# Patient Record
Sex: FEMALE | Race: OTHER
[De-identification: names, ages, dates, MRNs, and addresses within clinical notes are randomized per-mention and may not be internally consistent; named-entity substitution may affect disease eponyms.]

---

## 2018-03-14 ENCOUNTER — HOSPITAL ENCOUNTER (OUTPATIENT)
Dept: HOSPITAL 72 - SUR | Age: 53
Discharge: HOME | End: 2018-03-14
Payer: COMMERCIAL

## 2018-03-14 VITALS — DIASTOLIC BLOOD PRESSURE: 72 MMHG | SYSTOLIC BLOOD PRESSURE: 109 MMHG

## 2018-03-14 VITALS — SYSTOLIC BLOOD PRESSURE: 118 MMHG | DIASTOLIC BLOOD PRESSURE: 73 MMHG

## 2018-03-14 VITALS — DIASTOLIC BLOOD PRESSURE: 72 MMHG | SYSTOLIC BLOOD PRESSURE: 119 MMHG

## 2018-03-14 VITALS — DIASTOLIC BLOOD PRESSURE: 71 MMHG | SYSTOLIC BLOOD PRESSURE: 105 MMHG

## 2018-03-14 VITALS — DIASTOLIC BLOOD PRESSURE: 63 MMHG | SYSTOLIC BLOOD PRESSURE: 122 MMHG

## 2018-03-14 VITALS — SYSTOLIC BLOOD PRESSURE: 131 MMHG | DIASTOLIC BLOOD PRESSURE: 76 MMHG

## 2018-03-14 VITALS — SYSTOLIC BLOOD PRESSURE: 112 MMHG | DIASTOLIC BLOOD PRESSURE: 72 MMHG

## 2018-03-14 VITALS — BODY MASS INDEX: 23.9 KG/M2 | WEIGHT: 140 LBS | HEIGHT: 64 IN

## 2018-03-14 VITALS — SYSTOLIC BLOOD PRESSURE: 120 MMHG | DIASTOLIC BLOOD PRESSURE: 68 MMHG

## 2018-03-14 VITALS — DIASTOLIC BLOOD PRESSURE: 70 MMHG | SYSTOLIC BLOOD PRESSURE: 111 MMHG

## 2018-03-14 VITALS — DIASTOLIC BLOOD PRESSURE: 69 MMHG | SYSTOLIC BLOOD PRESSURE: 107 MMHG

## 2018-03-14 DIAGNOSIS — M20.5X1: ICD-10-CM

## 2018-03-14 DIAGNOSIS — F32.9: ICD-10-CM

## 2018-03-14 DIAGNOSIS — M21.611: Primary | ICD-10-CM

## 2018-03-14 DIAGNOSIS — M20.11: ICD-10-CM

## 2018-03-14 DIAGNOSIS — M19.071: ICD-10-CM

## 2018-03-14 PROCEDURE — 94150 VITAL CAPACITY TEST: CPT

## 2018-03-14 PROCEDURE — 97161 PT EVAL LOW COMPLEX 20 MIN: CPT

## 2018-03-14 PROCEDURE — 28234 INCISION OF FOOT TENDON: CPT

## 2018-03-14 PROCEDURE — 94003 VENT MGMT INPAT SUBQ DAY: CPT

## 2018-03-14 PROCEDURE — 28299 COR HLX VLGS DOUBLE OSTEOT: CPT

## 2018-03-14 PROCEDURE — 73630 X-RAY EXAM OF FOOT: CPT

## 2018-03-14 NOTE — OPERATIVE NOTE - DICTATED
DATE OF OPERATION:  03/14/2018



SURGEON:  Js López D.P.M.



ANESTHESIOLOGIST:  Raul Pal M.D.



ASSISTANT:  [facility] anabel.



PREOPERATIVE DIAGNOSIS:  Hallux abductovalgus with bunion

deformity.



POSTOPERATIVE DIAGNOSES:

1. Hallux abductovalgus with bunion deformity.

2. Osteoarthritis of first metatarsophalangeal joint, right foot.

3. Contracture extensor hallucis longus, right foot.



Procedures:

1.  Modified Ramesh Bunionectomy right foot with screw fixation

2.  Akin Osteotomy right foot

3.  Extensor Hallucis Longus tendon lengthening right foot



DESCRIPTION OF THE OPERATION:  The patient was brought to the operating

room and was placed on the operating room table in the supine position.

Intravenous sedation was administered by the anesthesiologist.  Local

anesthesia consisting of 0.5% Marcaine plain total of 20 mL was

administered to the right foot.  An ankle tourniquet was applied to the

right lower extremity.  The foot was prepped and draped in the usual

sterile manner.  An Esmarch bandage was utilized to exsanguinate the blood

and the right ankle tourniquet was inflated to 250 mmHg.  Attention was

then directed to the right hallux were an approximately 6 cm dorsal linear

skin incision was centered over the first metatarsophalangeal joint.  The

incision was deepened utilizing sharp and blunt dissection with care being

taken to cauterize and ligate all bleeders.  At the level of the joint, a

linear capsulotomy was performed.  The capsule was reflected medially and

laterally and the head of the first metatarsal was exposed.  Utilizing a

sagittal saw, the medial eminence was resected in total.  The remaining

bone was rasped smooth.  The lateral release was then performed utilizing

a #15 blade.  The wound was copiously flushed utilizing sterile saline.

At this point, chevron osteotomy was performed with the apex distal and

the arms protruding proximally.  Through and through osteotomy was carried

utilizing the sagittal saw.  The capital fragment was transposed laterally

and fixated onto the metatarsal shaft utilizing 24 mm 3-0 headless screw,

which was driven from dorsal proximal to plantar distal.  At this point, a

sagittal saw was utilized to resect the overhang medially.  The bone was

rasped smooth.  The wound was copiously flushed utilizing sterile saline.

Dissection was then carried distally over the proximal phalanx where the

soft tissue capsule and periosteum was reflected off the shaft of the

proximal phalanx.  At this point, a medial wedge osteotomy was performed

with care being taken to leave the lateral cortex intact.  The osteotomy

was reduced.  Two drill bits just proximal and distal to the osteotomy

were performed and an 8 x 8 mm staple was then utilized to fixate the

osteotomy and the staple was inserted into the drill holes.  The hallux

was noted to still be dorsal medially contracted secondary to an

contracted extensor tendon.  At this point, an extensor tendon lengthening

was performed in a Z-plasty manner and the hallux was brought into a more

rectus position.  It was noted also that the cartilage on the first

metatarsal head was eroded.  The wound was copiously flushed utilizing

sterile saline.  At this point, the capsule was reapproximated utilizing

3-0 Vicryl in a simple interrupted type stitch.  The subcutaneous tissue

was then reapproximated utilizing 4-0 Vicryl in a simple interrupted type

stitch.  The skin was then reapproximated utilizing 4-0 nylon in a simple

interrupted type stitch.  The wound was dressed utilizing an Adaptic 4 x 4

gauze, and 3-inch Teresita.  There right ankle tourniquet was deflated and

vascular supply was noted to all digits right foot.









  ______________________________________________

  Js López D.P.M.





DR:  KANDACE

D:  03/14/2018 11:27

T:  03/14/2018 17:02

JOB#:  [id number]

CC:



HAYLEY

## 2018-03-14 NOTE — ANETHESIA PREOPERATIVE EVAL
Anesthesia Pre-op PMH/ROS


General


Date of Evaluation:  Mar 14, 2018


Time of Evaluation:  07:10


Anesthesiologist:  CHIARA


ASA Score:  ASA 2


Mallampati Score


Class I : Soft palate, uvula, fauces, pillars visible


Class II: Soft palate, uvula, fauces visible


Class III: Soft palate, base of uvula visible


Class IV: Only hard plate visible


Mallampati Classification:  Class II


Surgeon:  RUPERTO


Diagnosis:  RIGHT BUNION


Surgical Procedure:  RIGHT BUNIECTOMY


Anesthesia History:  none


Family History:  no anesthesia problems


Allergies:  


Coded Allergies:  


     No Known Allergies (Unverified , 3/13/18)


Medications:  see eMAR





Anesthesia Pre-op Phys. Exam


Physician Exam





Last Vital Signs








  Date Time  Temp Pulse Resp B/P (MAP) Pulse Ox O2 Delivery O2 Flow Rate FiO2


 


3/14/18 06:49 98.5 73 18 119/72 100 Room Air  





 98.5       








Constitutional:  NAD


Neurologic:  CN 2-12 intact


Cardiovascular:  RRR


Respiratory:  CTA





Airway Exam


Mallampati Score:  Class II


MO:  full


ROM:  full


Teeth:  intact





Anesthesia Pre-op A/P


Risk Assessment & Plan


Plan:


GA


Status Change Before Surgery:  No





Pre-Antibiotics


Drug:  ANCEF


Given Within 1 Hr of Incision:  Yes


Time Given:  08:00











Raul Pal M.D. Mar 14, 2018 07:21

## 2018-03-14 NOTE — HISTORY AND PHYSICAL REPORT
DATE OF ADMISSION:  03/14/2018



HISTORY OF PRESENT ILLNESS:  This is a 52-year-old white female that is

admitted today for right foot surgery.  The patient has been complaining

of right foot pain for the past several months.  She has tried modifying

her shoes and padding without any success and was consulted by me on

correction of a painful bunion on the right foot.



PAST MEDICAL HISTORY:  Remarkable for depression and heart surgery to

correct mitral stenosis.



MEDICATIONS:  Wellbutrin.



ALLERGIES:  None.



PODIATRIC EXAMINATION:

VASCULAR STUDIES:  The vascular studies reveal intact reflexes measuring

2/4 bilaterally.  The capillary filling time is less than 4 seconds to all

digits bilaterally.  Homans sign is negative.  No varicosities are noted

in bilateral lower extremity.

NEUROLOGICAL EXAMINATION:  Intact.  Reflexes, Achilles, and patellar are

measuring 2/4, equal, and brisk bilaterally.  Sensation, proprioception,

and vibration are all intact in bilateral lower extremity.  Babinski is

negative.  Clonus is absent bilaterally.

MUSCULOSKELETAL EXAM:  Reveals hallux abductovalgus with bunion deformity

bilaterally with the right side worse than the left.  The hallux is

overriding the second digit on the right side.  Joint range of motion is

slightly reduced on the right.  No crepitation is noted.  No other

skeletal deformities are noted bilaterally.

DERMATOLOGICAL EXAMINATION:  Reveals intact skin without lesions,

ulcerations, or scars bilaterally.  The nails are all present and healthy

bilaterally.



ASSESSMENT:  Hallux abductovalgus with bunion deformity, right

foot.



PLAN:  The patient is admitted today for outpatient bunionectomy of her

right foot.  Postoperative instructions were given to the patient.

Postoperative medications were dispensed to the patient.  The patient

elected to proceed with surgery.









  ______________________________________________

  Js López D.P.M.





DR:  KANDACE

D:  03/14/2018 11:20

T:  03/14/2018 16:46

JOB#:  [id number]

CC:

## 2018-03-14 NOTE — BRIEF OPERATIVE NOTE
Immediate Post Operative Note


Operative Note


Pre-op Diagnosis:


hallux valgus right foot


Procedure:


Bunionectomy with osteotomy right foot


Post-op Diagnosis:


same plus DJD first MTP right and contracture right EHL tendon


Post-op Diagnosis:  same as pre-op plus


Surgeon:  Maribel


Anesthesiologist:  Demarco


Anesthesia:  MAC


Specimen:  yes


Complications:  none


Condition:  stable


Fluids:  150


Estimated Blood Loss:  minimal


Drains:  none


Implant(s) used?:  Yes











BIN BAR Mar 14, 2018 09:29

## 2018-03-14 NOTE — DIAGNOSTIC IMAGING REPORT
Indication: Status post right foot surgery

 

Technique: 3 views right foot

 

Comparison: none

 

Findings: There is evidence of bunionectomy. There is evidence of first metatarsal

osteotomy with a surgical screw reducing osteotomy. There is also evidence of

osteotomy of the first proximal phalanx, with a surgical staple in place. Small

amount of retained air from the surgical wound is noted in the soft tissues. No acute

fractures. No dislocations. The joint spaces are preserved.

 

Impression: Postoperative changes, as described. No features

## 2018-03-14 NOTE — IMMEDIATE POST-OP EVALUATION
Immediate Post-Op Evalulation


Immediate Post-Op Evalulation


Procedure:  RIGHT BUNIECTOMY


Date of Evaluation:  Mar 14, 2018


Time of Evaluation:  10:00


IV Fluids:  300


Blood Products:  0


Estimated Blood Loss:  0


Urinary Output:  0


Blood Pressure Systolic:  135


Blood Pressure Diastolic:  56


Pulse Rate:  78


Respiratory Rate:  16


O2 Sat by Pulse Oximetry:  99


Temperature (Fahrenheit):  98


Pain Score (1-10):  0


Nausea:  No


Vomiting:  No


Patient Status:  awake, reacts, patent


Drug:  ANCEF


Given Within 1 Hr of Incision:  Yes


Time Given:  08:00











Raul Pal M.D. Mar 14, 2018 07:23

## 2018-03-14 NOTE — PRE-PROCEDURE NOTE/ATTESTATION
Pre-Procedure Note/Attestation


Complete Prior to Procedure


Planned Procedure:  right


Procedure Narrative:


Bunionectomy osteotomy right foot





Indications for Procedure


Pre-Operative Diagnosis:


hallux valgus right foot





Attestation


I attest that I discussed the nature of the procedure; its benefits; risks and 

complications; and alternatives (and the risks and benefits of such alternatives

), prior to the procedure, with the patient (or the patient's legal 

representative).





I attest that, if there was a reasonable possibility of needing a blood 

transfusion, the patient (or the patient's legal representative) was given the 

[facility] standardized written summary, pursuant 

to the Humza Sidell Blood Safety Act ([place] Health and Safety Code # 1645, as 

amended).





I attest that I re-evaluated the patient just prior to the surgery and that 

there has been no change in the patient's H&P, except as documented below:











BIN BAR Mar 14, 2018 07:39